# Patient Record
Sex: MALE | Race: WHITE | NOT HISPANIC OR LATINO | URBAN - METROPOLITAN AREA
[De-identification: names, ages, dates, MRNs, and addresses within clinical notes are randomized per-mention and may not be internally consistent; named-entity substitution may affect disease eponyms.]

---

## 2017-05-03 ENCOUNTER — OFFICE VISIT - HEALTHEAST (OUTPATIENT)
Dept: INTERNAL MEDICINE | Facility: CLINIC | Age: 22
End: 2017-05-03

## 2017-05-03 DIAGNOSIS — K51.90 ULCERATIVE COLITIS (H): ICD-10-CM

## 2017-05-03 DIAGNOSIS — Z00.00 HEALTH MAINTENANCE EXAMINATION: ICD-10-CM

## 2017-05-03 ASSESSMENT — MIFFLIN-ST. JEOR: SCORE: 1575.47

## 2017-05-04 LAB — HEPATITIS B SURFACE ANTIBODY LHE- HISTORICAL: POSITIVE

## 2017-05-05 ENCOUNTER — COMMUNICATION - HEALTHEAST (OUTPATIENT)
Dept: INTERNAL MEDICINE | Facility: CLINIC | Age: 22
End: 2017-05-05

## 2017-05-05 ENCOUNTER — AMBULATORY - HEALTHEAST (OUTPATIENT)
Dept: NURSING | Facility: CLINIC | Age: 22
End: 2017-05-05

## 2017-06-13 ENCOUNTER — OFFICE VISIT - HEALTHEAST (OUTPATIENT)
Dept: INTERNAL MEDICINE | Facility: CLINIC | Age: 22
End: 2017-06-13

## 2017-06-13 DIAGNOSIS — K62.5 RECTAL BLEEDING: ICD-10-CM

## 2017-06-13 ASSESSMENT — MIFFLIN-ST. JEOR: SCORE: 1570.93

## 2017-06-15 ENCOUNTER — COMMUNICATION - HEALTHEAST (OUTPATIENT)
Dept: INTERNAL MEDICINE | Facility: CLINIC | Age: 22
End: 2017-06-15

## 2017-06-15 ENCOUNTER — AMBULATORY - HEALTHEAST (OUTPATIENT)
Dept: NURSING | Facility: CLINIC | Age: 22
End: 2017-06-15

## 2017-06-15 DIAGNOSIS — Z23 POLIO VACCINE NEEDED: ICD-10-CM

## 2017-06-19 ENCOUNTER — COMMUNICATION - HEALTHEAST (OUTPATIENT)
Dept: INTERNAL MEDICINE | Facility: CLINIC | Age: 22
End: 2017-06-19

## 2017-06-19 DIAGNOSIS — K51.90 ULCERATIVE COLITIS (H): ICD-10-CM

## 2017-06-20 ENCOUNTER — RECORDS - HEALTHEAST (OUTPATIENT)
Dept: ADMINISTRATIVE | Facility: OTHER | Age: 22
End: 2017-06-20

## 2017-12-18 ENCOUNTER — RECORDS - HEALTHEAST (OUTPATIENT)
Dept: ADMINISTRATIVE | Facility: OTHER | Age: 22
End: 2017-12-18

## 2017-12-19 ENCOUNTER — RECORDS - HEALTHEAST (OUTPATIENT)
Dept: ADMINISTRATIVE | Facility: OTHER | Age: 22
End: 2017-12-19

## 2017-12-20 ENCOUNTER — COMMUNICATION - HEALTHEAST (OUTPATIENT)
Dept: INTERNAL MEDICINE | Facility: CLINIC | Age: 22
End: 2017-12-20

## 2017-12-20 ENCOUNTER — AMBULATORY - HEALTHEAST (OUTPATIENT)
Dept: NURSING | Facility: CLINIC | Age: 22
End: 2017-12-20

## 2017-12-20 DIAGNOSIS — Z00.00 ROUTINE HEALTH MAINTENANCE: ICD-10-CM

## 2017-12-20 DIAGNOSIS — Z23 FLU VACCINE NEED: ICD-10-CM

## 2018-06-12 ENCOUNTER — COMMUNICATION - HEALTHEAST (OUTPATIENT)
Dept: INTERNAL MEDICINE | Facility: CLINIC | Age: 23
End: 2018-06-12

## 2018-06-12 DIAGNOSIS — Z02.1 ENCOUNTER FOR PRE-EMPLOYMENT EXAMINATION: ICD-10-CM

## 2018-06-13 ENCOUNTER — AMBULATORY - HEALTHEAST (OUTPATIENT)
Dept: NURSING | Facility: CLINIC | Age: 23
End: 2018-06-13

## 2018-06-13 ENCOUNTER — COMMUNICATION - HEALTHEAST (OUTPATIENT)
Dept: INTERNAL MEDICINE | Facility: CLINIC | Age: 23
End: 2018-06-13

## 2018-06-13 DIAGNOSIS — Z02.1 ENCOUNTER FOR PRE-EMPLOYMENT EXAMINATION: ICD-10-CM

## 2018-06-15 ENCOUNTER — AMBULATORY - HEALTHEAST (OUTPATIENT)
Dept: NURSING | Facility: CLINIC | Age: 23
End: 2018-06-15

## 2018-06-15 ENCOUNTER — RECORDS - HEALTHEAST (OUTPATIENT)
Dept: ADMINISTRATIVE | Facility: OTHER | Age: 23
End: 2018-06-15

## 2018-06-15 DIAGNOSIS — Z02.1 ENCOUNTER FOR PRE-EMPLOYMENT EXAMINATION: ICD-10-CM

## 2018-06-15 LAB
INDURATION - HISTORICAL: 0 MM
TB SKIN TEST - HISTORICAL: NEGATIVE
TB SKIN TEST - HISTORICAL: NEGATIVE

## 2018-07-18 ENCOUNTER — COMMUNICATION - HEALTHEAST (OUTPATIENT)
Dept: INTERNAL MEDICINE | Facility: CLINIC | Age: 23
End: 2018-07-18

## 2018-12-11 ENCOUNTER — RECORDS - HEALTHEAST (OUTPATIENT)
Dept: ADMINISTRATIVE | Facility: OTHER | Age: 23
End: 2018-12-11

## 2018-12-12 ENCOUNTER — RECORDS - HEALTHEAST (OUTPATIENT)
Dept: ADMINISTRATIVE | Facility: OTHER | Age: 23
End: 2018-12-12

## 2018-12-12 ENCOUNTER — COMMUNICATION - HEALTHEAST (OUTPATIENT)
Dept: INTERNAL MEDICINE | Facility: CLINIC | Age: 23
End: 2018-12-12

## 2018-12-14 ENCOUNTER — OFFICE VISIT - HEALTHEAST (OUTPATIENT)
Dept: INTERNAL MEDICINE | Facility: CLINIC | Age: 23
End: 2018-12-14

## 2018-12-14 ENCOUNTER — COMMUNICATION - HEALTHEAST (OUTPATIENT)
Dept: INTERNAL MEDICINE | Facility: CLINIC | Age: 23
End: 2018-12-14

## 2018-12-14 DIAGNOSIS — R06.2 WHEEZING: ICD-10-CM

## 2018-12-14 DIAGNOSIS — R00.2 PALPITATIONS: ICD-10-CM

## 2018-12-14 DIAGNOSIS — I45.10 INCOMPLETE RBBB: ICD-10-CM

## 2018-12-14 DIAGNOSIS — J45.909 REACTIVE AIRWAY DISEASE WITHOUT COMPLICATION, UNSPECIFIED ASTHMA SEVERITY, UNSPECIFIED WHETHER PERSISTENT: ICD-10-CM

## 2018-12-14 LAB
ATRIAL RATE - MUSE: 54 BPM
DIASTOLIC BLOOD PRESSURE - MUSE: NORMAL MMHG
INTERPRETATION ECG - MUSE: NORMAL
P AXIS - MUSE: 55 DEGREES
PR INTERVAL - MUSE: 162 MS
QRS DURATION - MUSE: 102 MS
QT - MUSE: 408 MS
QTC - MUSE: 386 MS
R AXIS - MUSE: 87 DEGREES
SYSTOLIC BLOOD PRESSURE - MUSE: NORMAL MMHG
T AXIS - MUSE: 62 DEGREES
VENTRICULAR RATE- MUSE: 54 BPM

## 2018-12-14 RX ORDER — ALBUTEROL SULFATE 90 UG/1
2 AEROSOL, METERED RESPIRATORY (INHALATION) EVERY 6 HOURS PRN
Qty: 1 EACH | Refills: 0 | Status: SHIPPED | OUTPATIENT
Start: 2018-12-14

## 2018-12-14 RX ORDER — METOPROLOL TARTRATE 25 MG/1
25 TABLET, FILM COATED ORAL 2 TIMES DAILY PRN
Qty: 30 TABLET | Refills: 2 | Status: SHIPPED | OUTPATIENT
Start: 2018-12-14

## 2018-12-15 ENCOUNTER — COMMUNICATION - HEALTHEAST (OUTPATIENT)
Dept: INTERNAL MEDICINE | Facility: CLINIC | Age: 23
End: 2018-12-15

## 2018-12-18 ENCOUNTER — RECORDS - HEALTHEAST (OUTPATIENT)
Dept: ADMINISTRATIVE | Facility: OTHER | Age: 23
End: 2018-12-18

## 2018-12-19 ENCOUNTER — HOSPITAL ENCOUNTER (OUTPATIENT)
Dept: CARDIOLOGY | Facility: CLINIC | Age: 23
Discharge: HOME | End: 2018-12-19
Attending: INTERNAL MEDICINE

## 2018-12-19 DIAGNOSIS — R00.2 PALPITATIONS: ICD-10-CM

## 2018-12-19 LAB
AORTIC ROOT: 3.5 CM
AORTIC VALVE MEAN VELOCITY: 92.7 CM/S
ASCENDING AORTA: 2.6 CM
AV CUSP SEPERATION: 2.3 CM
AV CUSP SEPERATION: 2.3 CM
AV DIMENSIONLESS INDEX VTI: 0.8
AV MEAN GRADIENT: 4 MMHG
AV PEAK GRADIENT: 6.3 MMHG
AV VALVE AREA: 2.9 CM2
AV VELOCITY RATIO: 0.8
BSA FOR ECHO PROCEDURE: 1.72 M2
CV ECHO HEIGHT: 66 IN
CV ECHO WEIGHT: 140 LBS
DOP CALC AO PEAK VEL: 125 CM/S
DOP CALC AO VTI: 27.3 CM
DOP CALC LVOT AREA: 3.46 CM2
DOP CALC LVOT DIAMETER: 2.1 CM
DOP CALC LVOT PEAK VEL: 106 CM/S
DOP CALC LVOT STROKE VOLUME: 80 CM3
DOP CALC MV VTI: 27.3 CM
DOP CALCLVOT PEAK VEL VTI: 23.1 CM
EJECTION FRACTION: 73 % (ref 55–75)
FRACTIONAL SHORTENING: 39.6 % (ref 28–44)
INTERVENTRICULAR SEPTUM IN END DIASTOLE: 0.8 CM (ref 0.6–1)
IVS/PW RATIO: 1
LA AREA 1: 14.1 CM2
LA AREA 2: 19.4 CM2
LEFT ATRIUM LENGTH: 4.51 CM
LEFT ATRIUM SIZE: 3 CM
LEFT ATRIUM VOLUME INDEX: 30 ML/M2
LEFT ATRIUM VOLUME: 51.6 ML
LEFT VENTRICLE CARDIAC INDEX: 2.4 L/MIN/M2
LEFT VENTRICLE CARDIAC OUTPUT: 4.1 L/MIN
LEFT VENTRICLE DIASTOLIC VOLUME INDEX: 55.8 CM3/M2 (ref 34–74)
LEFT VENTRICLE DIASTOLIC VOLUME: 96 CM3 (ref 62–150)
LEFT VENTRICLE HEART RATE: 51 BPM
LEFT VENTRICLE MASS INDEX: 73.7 G/M2
LEFT VENTRICLE SYSTOLIC VOLUME INDEX: 15.1 CM3/M2 (ref 11–31)
LEFT VENTRICLE SYSTOLIC VOLUME: 26 CM3 (ref 21–61)
LEFT VENTRICULAR INTERNAL DIMENSION IN DIASTOLE: 4.8 CM (ref 4.2–5.8)
LEFT VENTRICULAR INTERNAL DIMENSION IN SYSTOLE: 2.9 CM (ref 2.5–4)
LEFT VENTRICULAR MASS: 126.7 G
LEFT VENTRICULAR OUTFLOW TRACT MEAN GRADIENT: 3 MMHG
LEFT VENTRICULAR OUTFLOW TRACT MEAN VELOCITY: 78.5 CM/S
LEFT VENTRICULAR OUTFLOW TRACT PEAK GRADIENT: 4 MMHG
LEFT VENTRICULAR POSTERIOR WALL IN END DIASTOLE: 0.8 CM (ref 0.6–1)
LV STROKE VOLUME INDEX: 46.5 ML/M2
MITRAL VALVE DECELERATION SLOPE: 3430 MM/S2
MITRAL VALVE E/A RATIO: 2.1
MITRAL VALVE MEAN INFLOW VELOCITY: 36.2 CM/S
MITRAL VALVE PEAK VELOCITY: 95.2 CM/S
MITRAL VALVE PRESSURE HALF-TIME: 85 MS
MV AREA VTI: 2.93 CM2
MV AVERAGE E/E' RATIO: 4.6 CM/S
MV DECELERATION TIME: 120 MS
MV E'TISSUE VEL-LAT: 24.1 CM/S
MV E'TISSUE VEL-MED: 15.5 CM/S
MV LATERAL E/E' RATIO: 3.8
MV MEAN GRADIENT: 1 MMHG
MV MEDIAL E/E' RATIO: 5.9
MV PEAK A VELOCITY: 43.9 CM/S
MV PEAK E VELOCITY: 91.3 CM/S
MV PEAK GRADIENT: 3.6 MMHG
MV VALVE AREA BY CONTINUITY EQUATION: 2.9 CM2
MV VALVE AREA PRESSURE 1/2 METHOD: 2.6 CM2
NUC REST DIASTOLIC VOLUME INDEX: 2240 LBS
NUC REST SYSTOLIC VOLUME INDEX: 66 IN
PR MAX PG: 4 MMHG
PR PEAK VELOCITY: 110 CM/S
TRICUSPID REGURGITATION PEAK PRESSURE GRADIENT: 25.8 MMHG
TRICUSPID VALVE ANULAR PLANE SYSTOLIC EXCURSION: 2.6 CM
TRICUSPID VALVE PEAK REGURGITANT VELOCITY: 254 CM/S

## 2018-12-19 ASSESSMENT — MIFFLIN-ST. JEOR: SCORE: 1552.79

## 2018-12-21 ENCOUNTER — OFFICE VISIT - HEALTHEAST (OUTPATIENT)
Dept: CARDIOLOGY | Facility: CLINIC | Age: 23
End: 2018-12-21

## 2018-12-21 DIAGNOSIS — R00.2 PALPITATIONS: ICD-10-CM

## 2018-12-21 ASSESSMENT — MIFFLIN-ST. JEOR: SCORE: 1574.33

## 2018-12-23 ENCOUNTER — COMMUNICATION - HEALTHEAST (OUTPATIENT)
Dept: INTERNAL MEDICINE | Facility: CLINIC | Age: 23
End: 2018-12-23

## 2019-01-13 ENCOUNTER — COMMUNICATION - HEALTHEAST (OUTPATIENT)
Dept: INTERNAL MEDICINE | Facility: CLINIC | Age: 24
End: 2019-01-13

## 2019-01-29 ENCOUNTER — COMMUNICATION - HEALTHEAST (OUTPATIENT)
Dept: INTERNAL MEDICINE | Facility: CLINIC | Age: 24
End: 2019-01-29

## 2019-02-04 ENCOUNTER — COMMUNICATION - HEALTHEAST (OUTPATIENT)
Dept: INTERNAL MEDICINE | Facility: CLINIC | Age: 24
End: 2019-02-04

## 2019-03-25 ENCOUNTER — COMMUNICATION - HEALTHEAST (OUTPATIENT)
Dept: INTERNAL MEDICINE | Facility: CLINIC | Age: 24
End: 2019-03-25

## 2019-05-06 ENCOUNTER — COMMUNICATION - HEALTHEAST (OUTPATIENT)
Dept: INTERNAL MEDICINE | Facility: CLINIC | Age: 24
End: 2019-05-06

## 2019-05-06 ENCOUNTER — COMMUNICATION - HEALTHEAST (OUTPATIENT)
Dept: SCHEDULING | Facility: CLINIC | Age: 24
End: 2019-05-06

## 2019-05-08 ENCOUNTER — OFFICE VISIT - HEALTHEAST (OUTPATIENT)
Dept: INTERNAL MEDICINE | Facility: CLINIC | Age: 24
End: 2019-05-08

## 2019-05-08 DIAGNOSIS — R10.13 DYSPEPSIA: ICD-10-CM

## 2019-05-08 DIAGNOSIS — Z11.1 SCREENING-PULMONARY TB: ICD-10-CM

## 2019-05-08 LAB
ALBUMIN SERPL-MCNC: 4.3 G/DL (ref 3.5–5)
ALP SERPL-CCNC: 100 U/L (ref 45–120)
ALT SERPL W P-5'-P-CCNC: 20 U/L (ref 0–45)
ANION GAP SERPL CALCULATED.3IONS-SCNC: 10 MMOL/L (ref 5–18)
AST SERPL W P-5'-P-CCNC: 15 U/L (ref 0–40)
BILIRUB SERPL-MCNC: 0.9 MG/DL (ref 0–1)
BUN SERPL-MCNC: 11 MG/DL (ref 8–22)
CALCIUM SERPL-MCNC: 10 MG/DL (ref 8.5–10.5)
CHLORIDE BLD-SCNC: 103 MMOL/L (ref 98–107)
CO2 SERPL-SCNC: 29 MMOL/L (ref 22–31)
CREAT SERPL-MCNC: 0.86 MG/DL (ref 0.7–1.3)
GFR SERPL CREATININE-BSD FRML MDRD: >60 ML/MIN/1.73M2
GLUCOSE BLD-MCNC: 87 MG/DL (ref 70–125)
HGB BLD-MCNC: 16.4 G/DL (ref 14–18)
POTASSIUM BLD-SCNC: 4.4 MMOL/L (ref 3.5–5)
PROT SERPL-MCNC: 6.9 G/DL (ref 6–8)
SODIUM SERPL-SCNC: 142 MMOL/L (ref 136–145)

## 2019-05-08 RX ORDER — MESALAMINE 1.2 G/1
1200 TABLET, DELAYED RELEASE ORAL
Status: SHIPPED | COMMUNITY
Start: 2019-05-08

## 2019-05-08 ASSESSMENT — MIFFLIN-ST. JEOR: SCORE: 1544.56

## 2019-05-09 ENCOUNTER — COMMUNICATION - HEALTHEAST (OUTPATIENT)
Dept: INTERNAL MEDICINE | Facility: CLINIC | Age: 24
End: 2019-05-09

## 2019-05-10 ENCOUNTER — AMBULATORY - HEALTHEAST (OUTPATIENT)
Dept: LAB | Facility: CLINIC | Age: 24
End: 2019-05-10

## 2019-05-10 ENCOUNTER — AMBULATORY - HEALTHEAST (OUTPATIENT)
Dept: NURSING | Facility: CLINIC | Age: 24
End: 2019-05-10

## 2019-05-10 DIAGNOSIS — R10.13 DYSPEPSIA: ICD-10-CM

## 2019-05-10 DIAGNOSIS — Z00.00 PREVENTATIVE HEALTH CARE: ICD-10-CM

## 2019-05-10 LAB — TB SKIN TEST - HISTORICAL: NEGATIVE

## 2019-05-13 LAB
H PYLORI AG STL QL IA: NORMAL
REPORT STATUS: NORMAL
SPECIMEN DESCRIPTION: NORMAL

## 2019-12-13 ENCOUNTER — COMMUNICATION - HEALTHEAST (OUTPATIENT)
Dept: INTERNAL MEDICINE | Facility: CLINIC | Age: 24
End: 2019-12-13

## 2020-06-16 ENCOUNTER — RECORDS - HEALTHEAST (OUTPATIENT)
Dept: ADMINISTRATIVE | Facility: OTHER | Age: 25
End: 2020-06-16

## 2021-05-12 ENCOUNTER — RECORDS - HEALTHEAST (OUTPATIENT)
Dept: ADMINISTRATIVE | Facility: OTHER | Age: 26
End: 2021-05-12

## 2021-05-18 ENCOUNTER — RECORDS - HEALTHEAST (OUTPATIENT)
Dept: ADMINISTRATIVE | Facility: OTHER | Age: 26
End: 2021-05-18

## 2021-05-26 ENCOUNTER — RECORDS - HEALTHEAST (OUTPATIENT)
Dept: ADMINISTRATIVE | Facility: OTHER | Age: 26
End: 2021-05-26

## 2021-05-28 NOTE — PATIENT INSTRUCTIONS - HE
Avoid alcohol, tobacco, carbonated drinks, coffee, tea, chocolate, caffeine.  Avoid fatty foods.  Avoid citrus and tomato based products.  Don't eat within 3h laying down.  May raise up head of bed 6 inches by placing books or a brick under frame.  If overweight, weight loss can help.  Avoid ibuprofen or aleve.  If heartburn becomes exertional, changes in character, develop jet-black tarry or bloody stools, have coffee ground or bloody vomit or not controlled with medications and lifestyle adjustments, seek medical attention promptly.  If you develop increasing difficulty swallowing or pain with swallowing seek medical attention promptly.    Whole Foods, Plant-Based    Abundant vegetables.    Only whole grains.    2-4 fruits/day.    Avoid animal products such as dairy, eggs and meat. (instead, take a vitamin b12 supplement)    Avoid sugars, oils and other processed foods.    Documentary: [place]     Web: Nutritionstudies.org, Nutritionfacts.org    Books: How Not to Die (Dago), The Frederick Study (Jose Armando)    Omeprazole 20mg daily x 2 weeks after stool sample.

## 2021-05-28 NOTE — TELEPHONE ENCOUNTER
Spoke with pt and let him know he could get the mantoux on Wednesday.  Verified with pt that he could come in on Friday to get it read.  Pt understanding.

## 2021-05-28 NOTE — TELEPHONE ENCOUNTER
"Heartburn continues and abdominal pain x \"few months\".    H2 blockers have temporarily worked for epigastric pain but not recently.  Deep breath in will cause pain.    Hx ulcerative colitis.    Pain has returned and patient would like to follow up with any available provider.    Krystal Barroso RN, [facility] Nurse Triage/Med Refills RN     Reason for Disposition    Intermittent burning pains radiating into chest or sour taste in mouth    Protocols used: ABDOMINAL PAIN - UPPER-A-OH      "

## 2021-05-28 NOTE — TELEPHONE ENCOUNTER
New Appointment Needed  What is the reason for the visit:    Mantoux Placement  Appt Request  What is the purpose of the mantoux?:  School:    Is there a form to be completed?:   Yes  How soon do you need the mantoux placed?:  asap    Provider Preference: Any available  How soon do you need to be seen?: 5/8  Waitlist offered?: No  Okay to leave a detailed message:  Yes    Patient has an appointment scheduled on 5/8 at the clinic. Would like to get this done at that time.     Please call and advise.

## 2021-05-28 NOTE — PROGRESS NOTES
Health system Clinic Note    Patient Name: Joon Zamora  Patient Age: 23 y.o.  YOB: 1995  MRN: [id number]    Date of visit: 5/8/2019    Assessment/Plan:  No results found for this or any previous visit (from the past 24 hour(s)).  Medications Ordered   Medications     tuberculin injection 5 Units       ICD-10-CM    1. Dyspepsia R10.13 Comprehensive Metabolic Panel     Hemoglobin     H. pylori Antigen, Stool(HPSAG)     Occult Blood, Fecal   2. Screening-pulmonary TB Z11.1 tuberculin injection 5 Units       He would rather not do chest x-ray or ultrasound at this point.  He would rather just do some lab and try omeprazole again and see if this helps.  We discussed that if it is getting worse at all we could do those tests at a later time.  This does not sound cardiac to me.  Most likely gastroesophageal reflux.    Counseled patient regarding treatments, treatment options, risks and benefits and diagnosis.  The patient was interactive, attentive, verbalized understanding, and we discussed plan.       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  Sees a complementary/alternative practitioner as well     Family History   Problem Relation Age of Onset     Depression Mother      Anxiety disorder Mother      Bipolar disorder Father      Alcohol abuse Maternal Grandmother      Hypertension Maternal Grandfather      Depression Paternal Grandmother      Heart disease Paternal Grandfather         CABG     Outpatient Encounter Medications as of 5/8/2019   Medication Sig Dispense Refill     albuterol (PROAIR HFA;PROVENTIL HFA;VENTOLIN HFA) 90 mcg/actuation inhaler Inhale 2 puffs every 6 (six) hours as needed for wheezing. 1 each 0     herbal drugs Tab Take by mouth.       mesalamine (LIALDA) 1.2 gram EC tablet Take 1,200 mg by mouth daily with  "breakfast.       metoprolol tartrate (LOPRESSOR) 25 MG tablet Take 1 tablet (25 mg total) by mouth 2 (two) times a day as needed (palpitations). 30 tablet 2     Facility-Administered Encounter Medications as of 5/8/2019   Medication Dose Route Frequency Provider Last Rate Last Dose     tuberculin injection 5 Units  5 Units Intradermal Once Oleg Santos MD           Chief Complaint:   Chief Complaint   Patient presents with     Heartburn     Abdominal Pain     right side, spasms, when he takes deep breathes     Mantoux     needed for school       /78 (Patient Site: Left Arm, Patient Position: Sitting, Cuff Size: Adult Regular)   Pulse (!) 50   Ht 5' 6\" (1.676 m)   Wt 138 lb 3 oz (62.7 kg)   SpO2 99%   BMI 22.30 kg/m    HPI:   D.O. Student.  Has been having more stress because of this.  Has been feeling heartburn all semester.  Woke up a few nights in a row with epigastric pain which responded to omeprazole.  Took ranitidine for 2 months, had slight amount of heartburn with this.  It resolved for awhile and then 2 weeks ago started again.  Started zantac again.  Using at night primarily - notices when he lies down for bed.  Doesn't happen during the day when upright and able to do elliptical without any symptoms.       When he takes deep breaths will have slight pain ruq and right upper chest wall.  No abdominal pain otherwise.      No hematochezia.       ROS: Pertinent ros findings in hpi, all other systems negative.    Objective/Physical Exam:     /78 (Patient Site: Left Arm, Patient Position: Sitting, Cuff Size: Adult Regular)   Pulse (!) 50   Ht 5' 6\" (1.676 m)   Wt 138 lb 3 oz (62.7 kg)   SpO2 99%   BMI 22.30 kg/m      Gen: NAD, appears age  Skin: warm, dry  HENT: normocephalic atraumatic, MMM  Eyes: non-icteric, no proptosis  CV: NRRR no m/r/g, no peripheral edema  Resp: CTAB no w/r/r, normal respiratory effort  Abd: non-distended, soft  Hematologic: No petechiae or purpura  MSK: no " muscle or joint swelling  Neuro: no dysarthria or gross asymmetry  Psych: Cooperative, full affect    tender to palpation:no  soft:Yes  distended:no  pain with percussion:  no  hernia palpated: no  hepato-splenomegaly:no  masses: no    Jones's positive: no    ecchymoses:no  suprapubic tenderness:no  >3cm pulsating mass: no    Exam limited by body habitus: no     chest wall nttp.          Oleg Santos MD

## 2021-05-30 VITALS — WEIGHT: 145 LBS | BODY MASS INDEX: 23.3 KG/M2 | HEIGHT: 66 IN

## 2021-05-31 VITALS — HEIGHT: 66 IN | WEIGHT: 144 LBS | BODY MASS INDEX: 23.14 KG/M2

## 2021-06-01 ENCOUNTER — OFFICE VISIT (OUTPATIENT)
Dept: OCCUPATIONAL MEDICINE | Age: 26
End: 2021-06-01

## 2021-06-01 VITALS
HEART RATE: 66 BPM | SYSTOLIC BLOOD PRESSURE: 98 MMHG | HEIGHT: 67 IN | WEIGHT: 144.4 LBS | RESPIRATION RATE: 16 BRPM | BODY MASS INDEX: 22.66 KG/M2 | DIASTOLIC BLOOD PRESSURE: 60 MMHG

## 2021-06-01 DIAGNOSIS — Z02.89 VISIT FOR OCCUPATIONAL HEALTH EXAMINATION: ICD-10-CM

## 2021-06-01 DIAGNOSIS — Z02.89 ENCOUNTER FOR OCCUPATIONAL HEALTH EXAMINATION: Primary | ICD-10-CM

## 2021-06-01 PROCEDURE — OH021 PRE PLACEMENT PHYSICAL EXAM: Performed by: PREVENTIVE MEDICINE

## 2021-06-01 PROCEDURE — OH062 RESPIRATOR FIT TESTING: Performed by: PREVENTIVE MEDICINE

## 2021-06-01 PROCEDURE — OH071 RESPIRATORY (PFT) QUESTIONNAIRE: Performed by: PREVENTIVE MEDICINE

## 2021-06-01 PROCEDURE — OH044 DRUG SCREEN HAIR COLLECTION ONLY: Performed by: PREVENTIVE MEDICINE

## 2021-06-01 PROCEDURE — OH138 COMPREHENSIVE EYE EXAM: Performed by: PREVENTIVE MEDICINE

## 2021-06-01 PROCEDURE — OH053 WHISPER TEST PERFORMED IN OCC HEALTH: Performed by: PREVENTIVE MEDICINE

## 2021-06-01 PROCEDURE — 36415 COLL VENOUS BLD VENIPUNCTURE: CPT | Performed by: PREVENTIVE MEDICINE

## 2021-06-02 VITALS — BODY MASS INDEX: 22.44 KG/M2 | HEIGHT: 67 IN | WEIGHT: 143 LBS

## 2021-06-02 VITALS — WEIGHT: 138.19 LBS | BODY MASS INDEX: 22.21 KG/M2 | HEIGHT: 66 IN

## 2021-06-02 VITALS — BODY MASS INDEX: 22.5 KG/M2 | WEIGHT: 140 LBS | HEIGHT: 66 IN

## 2021-06-02 VITALS — BODY MASS INDEX: 22.71 KG/M2 | WEIGHT: 140.7 LBS

## 2021-06-10 NOTE — PROGRESS NOTES
ASSESSMENT:  1. Health maintenance examination  Joon Zamora is a healthy 21-year-old man aside from recent diagnosis of ulcerative colitis, who presents for follow-up, physical before starting [employer] medical school at [place] [facility] [place] [facility] [employer]  In regards to the ulcerative colitis, he had one episode after eating out and likely had infectious diarrhea which caused a flareup of the ulcerative colitis.  He has not had issues since.  We have requested records to be sent for this.  He needs to complete the series for polio vaccination, get PPD check, and titers as outlined below before starting school.  He is going to be attending an anatomy program before school.  - Hepatitis B Surface Antibody (Anti-HBs)  - Varicella Zoster Immune Status Antibody, IgG  - Rubeola Immune Status, IgG  - Mumps Immune Status Antibody, IgG  - Rubella Immune Status (IgG)  - Read PPD; Future  - TB Skin Test  - Poliovirus vaccine IPV subcutaneous/IM    2. Ulcerative colitis  No recurrence of symptoms.  Requested records for this workup.  We will need to discuss how this changes his colon cancer screening in the future.        PLAN:  Patient Instructions   Vaccine for polio    PPD skin test to screen for TB    Lab titers       Orders Placed This Encounter   Procedures     Poliovirus vaccine IPV subcutaneous/IM     Hepatitis B Surface Antibody (Anti-HBs)     Varicella Zoster Immune Status Antibody, IgG     Rubeola Immune Status, IgG     Mumps Immune Status Antibody, IgG     Rubella Immune Status (IgG)     Read PPD     Standing Status:   Future     Standing Expiration Date:   5/6/2017     TB Skin Test     Medications Discontinued During This Encounter   Medication Reason     thyroid, pork, 15 mg Tab Therapy completed       Return for Annual physical.    CHIEF COMPLAINT:  Chief Complaint   Patient presents with     Annual Exam       HISTORY OF PRESENT ILLNESS:  Joon is a 21 y.o. male presenting to the clinic today for his annual exam. He was  recently accepted to medical school and they require that he gets examined and updated on his vaccinations before coming to school.     REVIEW OF SYSTEMS:   Last summer he underwent a sigmoidoscopy which revealed UC. He is currently controlling his US through diet. He denies any issues or complications. He endorses experiencing bloody diarrhea before his sigmoidoscopy. He denies hearing or vision problems. He denies testicular nodules and groin lumps. He is active and enjoys running or lifting on occasion. All other systems are negative.    PFSH:  He was recently accepted to medical school and would like to pursue a career in family medicine. He had chicken pox as a child. 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 Reviewed as below.  History   Smoking Status     Never Smoker   Smokeless Tobacco     Not on file       Family History   Problem Relation Age of Onset     Depression Mother      Anxiety disorder Mother      Bipolar disorder Father      Alcohol abuse Maternal Grandmother      Hypertension Maternal Grandfather      Depression Paternal Grandmother      Heart disease Paternal Grandfather      CABG       Social History     Social History     Marital status: Single     Spouse name: N/A     Number of children: N/A     Years of education: N/A     Occupational History     Not on file.     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  Sees a complementary/alternative practitioner as well       Past Surgical History:   Procedure Laterality Date     NASAL SEPTUM SURGERY  2009, 10       Allergies   Allergen Reactions     Pollen Other (See Comments)       Active Ambulatory Problems     Diagnosis Date Noted     Ulcerative colitis 06/01/2016  "    Resolved Ambulatory Problems     Diagnosis Date Noted     No Resolved Ambulatory Problems     No Additional Past Medical History       VITALS:  Vitals:    05/03/17 1033   BP: 102/60   Pulse: (!) 55   Resp: 16   Temp: 97.6  F (36.4  C)   TempSrc: Tympanic   Weight: 145 lb (65.8 kg)   Height: 5' 6\" (1.676 m)     Wt Readings from Last 3 Encounters:   05/03/17 145 lb (65.8 kg)   07/31/15 148 lb (67.1 kg)     Body mass index is 23.4 kg/(m^2).    PHYSICAL EXAM:  ENT: Good dentition.  Neck: No lymphadenopathy  Lungs: Clear to auscultation  Heart: Regular rate and rhythm.  Abdomen: Soft. No masses or tenderness  General: Alert and well appearing.     ADDITIONAL HISTORY SUMMARIZED (2): Reviewed physical from 7/31/15 regarding patient history.  DECISION TO OBTAIN EXTRA INFORMATION (1): Requested outside records regarding sigmoidoscopy.   RADIOLOGY TESTS (1): None.  LABS (1): Ordered labs.  MEDICINE TESTS (1): None.  INDEPENDENT REVIEW (2 each): None.     The visit lasted a total of 20 minutes face to face with the patient. Over 50% of the time was spent counseling and educating the patient about health maintenance.    Harish RIVERA, am scribing for and in the presence of, Dr. Pavon.    IDr. Pavon, personally performed the services described in this documentation, as scribed by Harish Isidro in my presence, and it is both accurate and complete.    MEDICATIONS:  Current Outpatient Prescriptions   Medication Sig Dispense Refill     herbal drugs Tab Take by mouth.       No current facility-administered medications for this visit.        Total data points:4      "

## 2021-06-11 NOTE — PROGRESS NOTES
[facility] Clinic Note    Joon Zamora   21 y.o. male    Date of Visit: 2017  Chief Complaint   Patient presents with     Rectal Bleeding     Intermittent, bright red       ASSESSMENT/PLAN  1. Rectal bleeding       ---------------------------------------------    Small amount of rectal bleeding manifested by blood on the side of the stool, most likely internal hemorrhoid.  It is still possible that he could have ulcerative colitis, but I would expect more bleeding and diarrhea.  I recommended hydrocortisone 25 mg suppository twice a day as needed, can taper off this once symptoms improve.  If he still is rectal bleeding, we can start him on mesalamine again, possibly prednisone taper if more severe in the process of getting him back in with gastroenterology.    He will return for polio vaccination later, since our last vial has .  No vaccines given this visit.    Return if symptoms worsen or fail to improve, for Next scheduled follow up.      SUBJECTIVE  Joon Zamora is a 21-year-old man who presents for rectal bleeding ×2 weeks.  He has his usual 2-3 bowel movements per day, occasionally solid occasionally looser, with a streak of blood on the side of the stool.  This is not associated with any pain or bloating.  He does not have any blood mixed in with the stools.  This is reminiscent of last year, when he was diagnosed with ulcerative colitis with flexible sigmoidoscopy and biopsy showing crypt abscesses.  He was prescribed Lialda (mesalamine) and prednisone with an extended taper.  Symptoms resolved.    Flexible sigmoidoscopy also showed that he had internal hemorrhoids.  We discussed this.    He needs polio vaccination, but we no longer have this, need this ordered.    He moves down to [place] on  for [employer] medical school, anatomy Boot Camp starts July 3, class starts at the end of July.    ROS A comprehensive review of systems was performed and was otherwise  "negative    Medications, allergies, and problem list were reviewed and updated    Patient Active Problem List   Diagnosis     Ulcerative colitis     No past medical history on file.  Past Surgical History:   Procedure Laterality Date     NASAL SEPTUM SURGERY  2009, 10     Social History     Social History     Marital status: Single     Spouse name: N/A     Number of children: N/A     Years of education: N/A     Occupational History     Not on file.     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  Sees a complementary/alternative practitioner as well     Family History   Problem Relation Age of Onset     Depression Mother      Anxiety disorder Mother      Bipolar disorder Father      Alcohol abuse Maternal Grandmother      Hypertension Maternal Grandfather      Depression Paternal Grandmother      Heart disease Paternal Grandfather      CABG       Current Outpatient Prescriptions   Medication Sig Dispense Refill     herbal drugs Tab Take by mouth.       hydrocortisone 25 mg suppository Insert 1 suppository (25 mg total) into the rectum 2 (two) times a day for 7 days. 15 suppository 1     No current facility-administered medications for this visit.        Allergies   Allergen Reactions     Pollen Other (See Comments)       EXAM  Vitals:    06/13/17 1350   BP: 112/60   Patient Site: Left Arm   Patient Position: Sitting   Cuff Size: Adult Regular   Pulse: (!) 54   SpO2: 99%   Weight: 144 lb (65.3 kg)   Height: 5' 6\" (1.676 m)     General: Alert, no distress  ENT: Sclera anicteric, oral mucosa moist  Skin: No rashes  Rectal: No external hemorrhoids, no rectal masses or obvious internal hemorrhoids.  No blood on gloved finger at the end of the exam.  No sign " of perianal abscesses or fissures.    RESULTS REVIEWED:   Reviewed hospital stay from last year including flexible sigmoidoscopy and biopsy.    Data points 4    Rolando Pavon DO  Internal Medicine  [facility]

## 2021-06-13 ENCOUNTER — HEALTH MAINTENANCE LETTER (OUTPATIENT)
Age: 26
End: 2021-06-13

## 2021-06-16 PROBLEM — I45.10 INCOMPLETE RBBB: Status: ACTIVE | Noted: 2018-12-14

## 2021-06-16 PROBLEM — R06.2 WHEEZING: Status: ACTIVE | Noted: 2018-12-14

## 2021-06-16 PROBLEM — R00.2 PALPITATIONS: Status: ACTIVE | Noted: 2018-12-14

## 2021-06-22 NOTE — PROGRESS NOTES
[facility] Clinic Note    Joon Zamora   23 y.o. male    Date of Visit: 12/14/2018  Chief Complaint   Patient presents with     Follow-up     Chest Pain       ASSESSMENT/PLAN  1. Palpitations  DORCAS Hook-Up    Electrocardiogram Perform and Read    Echo With Bubble Study    Ambulatory referral to Cardiology   2. Incomplete RBBB     3. Wheezing  albuterol (PROAIR HFA;PROVENTIL HFA;VENTOLIN HFA) 90 mcg/actuation inhaler   4. Reactive airway disease without complication, unspecified asthma severity, unspecified whether persistent  albuterol (PROAIR HFA;PROVENTIL HFA;VENTOLIN HFA) 90 mcg/actuation inhaler     ---------------------------------------------    1-2. Young healthy man who experienced episodic palpitations and more severe ones in the context of high stress and use of stimulant for focus.  EKG showed RAD, incomplete RBBB and recommended echo with bubble to eval for ASD, event monitor for arrhythmia evaluation (10d), repeat EKG (actually normal now) and cardiology consultation.  I am not sure why the tracing is now normal.  I don't have a very high suspicion of cardiac disease, but SVT is a possibility or similar.  Stress a factor, and anxiety as a diagnosis of exclusion  --in the event of palpitation, recommended trying to take a metoprolol, but would be more prudent to see what is actually causing these symptoms first.    --unclear why drinking cold water helps (vagal tone?)    3. Wheezing noted with exercise.  I recommended an inhaler to try before exercise, more workup at a later date.     4.  See above    Return in about 2 weeks (around 12/28/2018) for Recheck.      SUBJECTIVE  Joon Zamora is here for follow-up.     About 3 weeks ago he saw a PA for fatigue, was having trouble sleeping and learning at med school in [place].      Thyroid, hm2, vitamin b12 all normal.  He was having flaring of UC at the beginning of the semester.    After all the labs were back they suggested a sleep study.     He had some difficulty breathing with exercise, wheezing.      During finals, things have been very stressful, facing difficult tests.  He was drinking coffee, not sleeping, fatigue getting worse.  A friend offered him Adderall for 3 days (10 mg IR) noon each day with lunch.  This was 12/6-8.  On the 8th about 2h after taking the last dose he was studying and heart started to beat really fast.  He has a history of palpitations.  His friend took him back to his house, he took activated charcoal to try and get it out of his system.  At the house the heart started beating extremely hard and fast.  He had to lie down.  He tried carotid massage.  He didn't check his heart rate.  The rate started to come down.  He got uncontrollable tremors after that then felt ok the rest of the day.  The following day he was taking a hot shower and 30 min after the heart started beating really fast.  He had to lie down and do carotid massage.  It eventually subside.  The onset is always sudden.  Occasionally would feel a little chilled or shaky.      Then 2 days after the initial episode he was drinking a lot of water and didn't have any issues, but notes he did not take a hot shower.  He was back to studying again and was able to stay focused.  Then the day after that when getting ready to take the test (now 3 days ago from today).  After taking a hot shower again he developed palpitations again.       ROS A comprehensive review of systems was performed and was otherwise negative    Medications, allergies, and problem list were reviewed and updated    Patient Active Problem List   Diagnosis     Ulcerative colitis (H)     Palpitations     Incomplete RBBB     Wheezing     No past medical history on file.  Past Surgical History:   Procedure Laterality Date     NASAL SEPTUM SURGERY  2009, 10     Social History     Socioeconomic History     Marital status: Single     Spouse name: Not on file     Number of children: Not on file     Years  of education: Not on file     Highest education level: Not on file   Social Needs     Financial resource strain: Not on file     Food insecurity - worry: Not on file     Food insecurity - inability: Not on file     Transportation needs - medical: Not on file     Transportation needs - non-medical: Not on file   Occupational History     Not on file   Tobacco Use     Smoking status: Never Smoker     Smokeless tobacco: Never Used   Substance and Sexual Activity     Alcohol use: Yes     Alcohol/week: 0.6 oz     Types: 1 Cans of beer per week     Drug use: No     Sexual activity: No   Other Topics Concern     Not on file   Social History Narrative    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  Sees a complementary/alternative practitioner as well     Family History   Problem Relation Age of Onset     Depression Mother      Anxiety disorder Mother      Bipolar disorder Father      Alcohol abuse Maternal Grandmother      Hypertension Maternal Grandfather      Depression Paternal Grandmother      Heart disease Paternal Grandfather         CABG       Current Outpatient Medications   Medication Sig Dispense Refill     albuterol (PROAIR HFA;PROVENTIL HFA;VENTOLIN HFA) 90 mcg/actuation inhaler Inhale 2 puffs every 6 (six) hours as needed for wheezing. 1 each 0     herbal drugs Tab Take by mouth.       metoprolol tartrate (LOPRESSOR) 25 MG tablet Take 1 tablet (25 mg total) by mouth 2 (two) times a day as needed (palpitations). 30 tablet 2     No current facility-administered medications for this visit.        Allergies   Allergen Reactions     Pollen Other (See Comments)       EXAM  Vitals:    12/14/18 1447   BP: 118/64   Patient Site: Left Arm   Patient Position: Sitting   Cuff Size: Adult Regular   Pulse: 66   SpO2: 98%   Weight: 140 lb 11.2 oz (63.8 kg)         General: alert, no distress  HEENT: sclerae anicteric, moist oral mucosa  Heart: Regular rate and rhythm, no murmurs.  Physiologic split S2.  No pretibial edema.   Strong PMI.  Warm extremities  Lungs: Clear to auscultation bilat  Gastrointestinal: abdomen is non-distended.    Skin: warm/dry, no rashes  Neuro: no gross abnormalities  Psych: calm, not appearing anxious      RESULTS REVIEWED:     ANALYSIS AND SUMMARY OF OLD RECORDS, NOTES AND CONSULTS (2): reviewed ER notes from 12/11    RECORDS REQUESTED (1): None.     OTHER HISTORY SUMMARIZED (from nursing staff, family, friends) (2):     RADIOLOGY TESTS SUMMARIZED or REQUESTED (XRAY/CT/MRI/DXA) (1):     MEDICINE TESTS SUMMARIZED or REQUESTED (EKG/ECHO/COLONOSCOPY/EGD) (1): EKG reviewed, sinus arrhythmia, incomplete RBBB, early repolarization    INDEPENDENT REVIEW OF EKG OR X-RAY (2): None    Troponin, hemoglobin, creatinine normal    Data points  4     Rolando Pavon DO  Internal Medicine  [facility]

## 2021-06-27 NOTE — PROGRESS NOTES
Progress Notes by Kaya Cormier MD at 12/21/2018 12:50 PM     Author: Kaya Cormier MD Service: -- Author Type: Physician    Filed: 12/21/2018  1:37 PM Encounter Date: 12/21/2018 Status: Signed    : Kaya Cormier MD (Physician)           Click to link to [facility] system [facility] HEART CARE NOTE    Thank you, Dr. Pavon, for asking us to see Joon Zamora at the [facility].      Assessment/Recommendations   Assessment:    23-year-old man who I am seeing today in rapid access clinic for palpitations.  He had 2 episodes of palpitations that do sound consistent with possible supraventricular tachycardia.  This may have been triggered by increased caffeine intake, dehydration and taking Adderall.  No recurrent symptoms since that time.  Echocardiogram shows no structural heart disease.  If the DORCAS monitor is unremarkable no further workup at this time recommended.       History of Present Illness    Mr. Joon Zamora is a 23 y.o. male who is here in rapid access clinic for palpitations.  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  He was under a great deal of stress and studying for finals.  He normally does not drink coffee or caffeine and was drinking a great deal more of caffeine for studying.  He also took Adderall that he was given from a friend.  On the third day of taking Adderall he developed palpitations.  His heart was racing and pounding very hard for 5 minutes and then resolved on its own.  He had another episode of this that occurred the other day after taking a hot shower.  No recurrent episodes since that time.  He was seen in urgent care and underwent an EKG and lab work.  I cannot find the lab work he reports these were normal.  EKG was unremarkable.  Twelve-lead EKG on 12/14/18 shows sinus bradycardia at 54 bpm otherwise normal.      Echocardiogram 12/19/18 left ventricle ejection fraction is normal. The calculated left ventricular  ejection fraction is 73%.    Normal left ventricular size and systolic function.    Normal right ventricular size and systolic function.    No hemodynamically significant valvular heart abnormalities.     Physical Examination Review of Systems   Vitals:    12/21/18 1245   BP: 120/62   Pulse: (!) 56   Resp: 16     Body mass index is 22.74 kg/m .  Wt Readings from Last 3 Encounters:   12/21/18 143 lb (64.9 kg)   12/19/18 140 lb (63.5 kg)   12/14/18 140 lb 11.2 oz (63.8 kg)       General Appearance:   alert, no apparent distress   HEENT:  no scleral icterus; the mucous membranes are pink and moist                                  Neck: jugular venous pressure normal, no thyromegaly   Chest: the spine is straight and the chest is symmetric   Lungs:   respirations unlabored; the lungs are clear to auscultation   Cardiovascular:   regular rhythm with normal first and second heart sounds and no murmurs or gallops;  there are no carotid bruits.   Abdomen:  no organomegaly, masses, bruits, or tenderness; bowel sounds are present   Extremities: no edema   Skin: no xanthelasma    General: WNL  Eyes: WNL  Ears/Nose/Throat: WNL  Lungs: Shortness of Breath  Heart: Chest Pain, Irregular Heartbeat  Stomach: Diarrhea  Bladder: WNL  Muscle/Joints: WNL  Skin: WNL  Nervous System: WNL  Mental Health: WNL     Blood: WNL     Medical History  Surgical History Family History Social History   Ulcerative colitis Past Surgical History:   Procedure Laterality Date   ? NASAL SEPTUM SURGERY  2009, 8    Family History   Problem Relation Age of Onset   ? Depression Mother    ? Anxiety disorder Mother    ? Bipolar disorder Father    ? Alcohol abuse Maternal Grandmother    ? Hypertension Maternal Grandfather    ? Depression Paternal Grandmother    ? Heart disease Paternal Grandfather         CABG    Social History     Socioeconomic History   ? Marital status: Single     Spouse name: Not on file   ? Number of children: Not on file   ? Years of  education: Not on file   ? Highest education level: Not on file   Social Needs   ? Financial resource strain: Not on file   ? Food insecurity - worry: Not on file   ? Food insecurity - inability: Not on file   ? Transportation needs - medical: Not on file   ? Transportation needs - non-medical: Not on file   Occupational History   ? Not on file   Tobacco Use   ? Smoking status: Never Smoker   ? Smokeless tobacco: Never Used   Substance and Sexual Activity   ? Alcohol use: Yes     Alcohol/week: 0.6 oz     Types: 1 Cans of beer per week   ? Drug use: No   ? Sexual activity: No   Other Topics Concern   ? 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  Sees a complementary/alternative practitioner as well          Medications  Allergies   Current Outpatient Medications   Medication Sig Dispense Refill   ? albuterol (PROAIR HFA;PROVENTIL HFA;VENTOLIN HFA) 90 mcg/actuation inhaler Inhale 2 puffs every 6 (six) hours as needed for wheezing. 1 each 0   ? metoprolol tartrate (LOPRESSOR) 25 MG tablet Take 1 tablet (25 mg total) by mouth 2 (two) times a day as needed (palpitations). 30 tablet 2   ? herbal drugs Tab Take by mouth.       No current facility-administered medications for this visit.       Allergies   Allergen Reactions   ? Pollen Other (See Comments)         Lab Results    Chemistry/lipid CBC Cardiac Enzymes/BNP/TSH/INR   Lab Results   Component Value Date    CHOL 133 07/31/2015    HDL 53 07/31/2015    LDLCALC 67 07/31/2015    TRIG 64 07/31/2015    No results found for: WBC, HGB, HCT, MCV, PLT Lab Results   Component Value Date    TSH 0.97 07/31/2015

## 2021-07-03 NOTE — ADDENDUM NOTE
Addendum Note by Lori Pavon DO at 12/14/2018  3:15 PM     Author: Lori Pavon DO Service: -- Author Type: Physician    Filed: 12/15/2018 10:46 AM Encounter Date: 12/14/2018 Status: Signed    : Lori Pavon DO (Physician)    Addended by: LORI PAVON on: 12/15/2018 10:46 AM        Modules accepted: Level of Service

## 2021-07-16 ENCOUNTER — OFFICE VISIT (OUTPATIENT)
Dept: INTERNAL MEDICINE | Age: 26
End: 2021-07-16

## 2021-07-16 ENCOUNTER — TELEPHONE (OUTPATIENT)
Dept: INTERNAL MEDICINE | Age: 26
End: 2021-07-16

## 2021-07-16 VITALS
WEIGHT: 141.31 LBS | HEIGHT: 67 IN | OXYGEN SATURATION: 98 % | BODY MASS INDEX: 22.18 KG/M2 | SYSTOLIC BLOOD PRESSURE: 96 MMHG | DIASTOLIC BLOOD PRESSURE: 56 MMHG | HEART RATE: 56 BPM

## 2021-07-16 DIAGNOSIS — Z76.89 ENCOUNTER TO ESTABLISH CARE: Primary | ICD-10-CM

## 2021-07-16 DIAGNOSIS — Z00.00 PREVENTATIVE HEALTH CARE: ICD-10-CM

## 2021-07-16 DIAGNOSIS — R42 LIGHTHEADEDNESS: ICD-10-CM

## 2021-07-16 DIAGNOSIS — K51.90 ULCERATIVE COLITIS WITHOUT COMPLICATIONS, UNSPECIFIED LOCATION (CMD): ICD-10-CM

## 2021-07-16 PROCEDURE — 99385 PREV VISIT NEW AGE 18-39: CPT | Performed by: PEDIATRICS

## 2021-07-16 RX ORDER — ALBUTEROL SULFATE 90 UG/1
2 AEROSOL, METERED RESPIRATORY (INHALATION)
COMMUNITY
Start: 2018-12-14

## 2021-07-16 RX ORDER — MESALAMINE 1.2 G/1
2.4 TABLET, DELAYED RELEASE ORAL
Qty: 60 TABLET | Refills: 1 | Status: SHIPPED | OUTPATIENT
Start: 2021-07-16 | End: 2021-09-17

## 2021-07-16 RX ORDER — MESALAMINE 1.2 G/1
1200 TABLET, DELAYED RELEASE ORAL
COMMUNITY
End: 2021-07-16 | Stop reason: SDUPTHER

## 2021-07-16 ASSESSMENT — PATIENT HEALTH QUESTIONNAIRE - PHQ9
SUM OF ALL RESPONSES TO PHQ9 QUESTIONS 1 AND 2: 0
SUM OF ALL RESPONSES TO PHQ9 QUESTIONS 1 AND 2: 0
CLINICAL INTERPRETATION OF PHQ2 SCORE: NO FURTHER SCREENING NEEDED
2. FEELING DOWN, DEPRESSED OR HOPELESS: NOT AT ALL
CLINICAL INTERPRETATION OF PHQ9 SCORE: NO FURTHER SCREENING NEEDED
1. LITTLE INTEREST OR PLEASURE IN DOING THINGS: NOT AT ALL

## 2021-07-20 ENCOUNTER — TELEPHONE (OUTPATIENT)
Dept: INTERNAL MEDICINE | Age: 26
End: 2021-07-20

## 2021-09-17 RX ORDER — MESALAMINE 1.2 G/1
TABLET, DELAYED RELEASE ORAL
Qty: 60 TABLET | Refills: 1 | Status: SHIPPED | OUTPATIENT
Start: 2021-09-17 | End: 2021-12-30 | Stop reason: SDUPTHER

## 2021-09-21 ENCOUNTER — TELEPHONE (OUTPATIENT)
Dept: ADMISSION | Age: 26
End: 2021-09-21

## 2021-09-22 ENCOUNTER — OFFICE VISIT (OUTPATIENT)
Dept: GASTROENTEROLOGY | Age: 26
End: 2021-09-22

## 2021-09-22 ENCOUNTER — LAB SERVICES (OUTPATIENT)
Dept: LAB | Age: 26
End: 2021-09-22
Attending: INTERNAL MEDICINE

## 2021-09-22 VITALS
WEIGHT: 141 LBS | SYSTOLIC BLOOD PRESSURE: 100 MMHG | OXYGEN SATURATION: 99 % | DIASTOLIC BLOOD PRESSURE: 60 MMHG | HEART RATE: 56 BPM | BODY MASS INDEX: 22.08 KG/M2

## 2021-09-22 DIAGNOSIS — K51.00 ULCERATIVE PANCOLITIS WITHOUT COMPLICATION (CMD): ICD-10-CM

## 2021-09-22 DIAGNOSIS — R10.13 EPIGASTRIC ABDOMINAL PAIN: Primary | ICD-10-CM

## 2021-09-22 DIAGNOSIS — R10.13 EPIGASTRIC ABDOMINAL PAIN: ICD-10-CM

## 2021-09-22 LAB
ALBUMIN SERPL-MCNC: 4.1 G/DL (ref 3.6–5.1)
ALBUMIN/GLOB SERPL: 1.4 {RATIO} (ref 1–2.4)
ALP SERPL-CCNC: 92 UNITS/L (ref 45–117)
ALT SERPL-CCNC: 22 UNITS/L
ANION GAP SERPL CALC-SCNC: 10 MMOL/L (ref 10–20)
AST SERPL-CCNC: 13 UNITS/L
BASOPHILS # BLD: 0.1 K/MCL (ref 0–0.3)
BASOPHILS NFR BLD: 2 %
BILIRUB SERPL-MCNC: 0.9 MG/DL (ref 0.2–1)
BUN SERPL-MCNC: 15 MG/DL (ref 6–20)
BUN/CREAT SERPL: 13 (ref 7–25)
CALCIUM SERPL-MCNC: 8.7 MG/DL (ref 8.4–10.2)
CHLORIDE SERPL-SCNC: 105 MMOL/L (ref 98–107)
CO2 SERPL-SCNC: 32 MMOL/L (ref 21–32)
CREAT SERPL-MCNC: 1.12 MG/DL (ref 0.67–1.17)
DEPRECATED RDW RBC: 40.3 FL (ref 39–50)
EOSINOPHIL # BLD: 0.2 K/MCL (ref 0–0.5)
EOSINOPHIL NFR BLD: 3 %
ERYTHROCYTE [DISTWIDTH] IN BLOOD: 11.9 % (ref 11–15)
FASTING DURATION TIME PATIENT: ABNORMAL H
GFR SERPLBLD BASED ON 1.73 SQ M-ARVRAT: 89 ML/MIN
GLOBULIN SER-MCNC: 3 G/DL (ref 2–4)
GLUCOSE SERPL-MCNC: 102 MG/DL (ref 65–99)
HCT VFR BLD CALC: 45.7 % (ref 39–51)
HGB BLD-MCNC: 15.7 G/DL (ref 13–17)
IMM GRANULOCYTES # BLD AUTO: 0 K/MCL (ref 0–0.2)
IMM GRANULOCYTES # BLD: 0 %
LYMPHOCYTES # BLD: 1.5 K/MCL (ref 1–4.8)
LYMPHOCYTES NFR BLD: 30 %
MCH RBC QN AUTO: 31.8 PG (ref 26–34)
MCHC RBC AUTO-ENTMCNC: 34.4 G/DL (ref 32–36.5)
MCV RBC AUTO: 92.5 FL (ref 78–100)
MONOCYTES # BLD: 0.6 K/MCL (ref 0.3–0.9)
MONOCYTES NFR BLD: 13 %
NEUTROPHILS # BLD: 2.5 K/MCL (ref 1.8–7.7)
NEUTROPHILS NFR BLD: 52 %
NRBC BLD MANUAL-RTO: 0 /100 WBC
PLATELET # BLD AUTO: 232 K/MCL (ref 140–450)
POTASSIUM SERPL-SCNC: 4.6 MMOL/L (ref 3.4–5.1)
PROT SERPL-MCNC: 7.1 G/DL (ref 6.4–8.2)
RBC # BLD: 4.94 MIL/MCL (ref 4.5–5.9)
SODIUM SERPL-SCNC: 142 MMOL/L (ref 135–145)
WBC # BLD: 4.8 K/MCL (ref 4.2–11)

## 2021-09-22 PROCEDURE — 82784 ASSAY IGA/IGD/IGG/IGM EACH: CPT

## 2021-09-22 PROCEDURE — 80053 COMPREHEN METABOLIC PANEL: CPT

## 2021-09-22 PROCEDURE — 83516 IMMUNOASSAY NONANTIBODY: CPT

## 2021-09-22 PROCEDURE — 36415 COLL VENOUS BLD VENIPUNCTURE: CPT

## 2021-09-22 PROCEDURE — 85025 COMPLETE CBC W/AUTO DIFF WBC: CPT

## 2021-09-22 PROCEDURE — 99243 OFF/OP CNSLTJ NEW/EST LOW 30: CPT | Performed by: INTERNAL MEDICINE

## 2021-09-23 LAB — IGA SERPL-MCNC: 231 MG/DL (ref 82–453)

## 2021-09-24 LAB
TTG IGA SER IA-ACNC: 18 UNITS
TTG IGG SER IA-ACNC: 3 UNITS

## 2021-09-27 ENCOUNTER — TELEPHONE (OUTPATIENT)
Dept: GASTROENTEROLOGY | Age: 26
End: 2021-09-27

## 2021-10-03 ENCOUNTER — HEALTH MAINTENANCE LETTER (OUTPATIENT)
Age: 26
End: 2021-10-03

## 2021-10-24 ENCOUNTER — NURSE ONLY (OUTPATIENT)
Dept: URGENT CARE | Age: 26
End: 2021-10-24

## 2021-10-24 DIAGNOSIS — Z23 NEEDS FLU SHOT: Primary | ICD-10-CM

## 2021-10-24 PROCEDURE — 90686 IIV4 VACC NO PRSV 0.5 ML IM: CPT

## 2021-10-24 PROCEDURE — 90471 IMMUNIZATION ADMIN: CPT

## 2021-12-29 ENCOUNTER — E-ADVICE (OUTPATIENT)
Dept: GASTROENTEROLOGY | Age: 26
End: 2021-12-29

## 2021-12-30 RX ORDER — MESALAMINE 1.2 G/1
2.4 TABLET, DELAYED RELEASE ORAL
Qty: 180 TABLET | Refills: 3 | Status: SHIPPED | OUTPATIENT
Start: 2021-12-30 | End: 2023-01-10 | Stop reason: SDUPTHER

## 2022-01-10 ENCOUNTER — EMPLOYEE HEALTH (OUTPATIENT)
Dept: OTHER | Age: 27
End: 2022-01-10

## 2022-04-22 ENCOUNTER — NURSE ONLY (OUTPATIENT)
Dept: OCCUPATIONAL MEDICINE | Age: 27
End: 2022-04-22

## 2022-04-22 DIAGNOSIS — Z02.89 VISIT FOR OCCUPATIONAL HEALTH EXAMINATION: Primary | ICD-10-CM

## 2022-04-22 PROCEDURE — 86480 TB TEST CELL IMMUN MEASURE: CPT | Performed by: INTERNAL MEDICINE

## 2022-04-22 PROCEDURE — 36415 COLL VENOUS BLD VENIPUNCTURE: CPT | Performed by: NURSE PRACTITIONER

## 2022-04-24 LAB
GAMMA INTERFERON BACKGROUND BLD IA-ACNC: 0.02 IU/ML
M TB IFN-G BLD-IMP: NEGATIVE
M TB IFN-G CD4+ BCKGRND COR BLD-ACNC: 0.01 IU/ML
M TB IFN-G CD4+CD8+ BCKGRND COR BLD-ACNC: 0 IU/ML
MITOGEN IGNF BCKGRD COR BLD-ACNC: >10 IU/ML

## 2022-07-10 ENCOUNTER — HEALTH MAINTENANCE LETTER (OUTPATIENT)
Age: 27
End: 2022-07-10

## 2022-09-10 ENCOUNTER — HEALTH MAINTENANCE LETTER (OUTPATIENT)
Age: 27
End: 2022-09-10

## 2022-09-13 ENCOUNTER — NURSE ONLY (OUTPATIENT)
Dept: OCCUPATIONAL MEDICINE | Age: 27
End: 2022-09-13

## 2022-09-13 DIAGNOSIS — Z02.89 VISIT FOR OCCUPATIONAL HEALTH EXAMINATION: Primary | ICD-10-CM

## 2022-09-13 PROCEDURE — 36415 COLL VENOUS BLD VENIPUNCTURE: CPT | Performed by: PHYSICIAN ASSISTANT

## 2022-09-13 PROCEDURE — 86480 TB TEST CELL IMMUN MEASURE: CPT | Performed by: INTERNAL MEDICINE

## 2022-09-15 LAB
GAMMA INTERFERON BACKGROUND BLD IA-ACNC: 0.02 IU/ML
M TB IFN-G BLD-IMP: NEGATIVE
M TB IFN-G CD4+ BCKGRND COR BLD-ACNC: 0 IU/ML
M TB IFN-G CD4+CD8+ BCKGRND COR BLD-ACNC: 0.01 IU/ML
MITOGEN IGNF BCKGRD COR BLD-ACNC: 5.5 IU/ML

## 2022-10-30 ENCOUNTER — EMPLOYEE HEALTH (OUTPATIENT)
Dept: OTHER | Age: 27
End: 2022-10-30

## 2022-11-01 ENCOUNTER — EMPLOYEE HEALTH (OUTPATIENT)
Dept: OTHER | Age: 27
End: 2022-11-01

## 2022-11-05 ENCOUNTER — IMMUNIZATION (OUTPATIENT)
Dept: URGENT CARE | Age: 27
End: 2022-11-05

## 2022-11-05 DIAGNOSIS — Z23 ENCOUNTER FOR IMMUNIZATION: Primary | ICD-10-CM

## 2022-11-05 PROBLEM — I45.10 INCOMPLETE RBBB: Status: ACTIVE | Noted: 2018-12-14

## 2022-11-05 PROBLEM — R06.2 WHEEZING: Status: ACTIVE | Noted: 2018-12-14

## 2022-11-05 PROBLEM — R00.2 PALPITATIONS: Status: ACTIVE | Noted: 2018-12-14

## 2022-11-05 PROBLEM — R06.2 WHEEZING: Status: RESOLVED | Noted: 2018-12-14 | Resolved: 2022-11-05

## 2022-11-05 PROCEDURE — 90686 IIV4 VACC NO PRSV 0.5 ML IM: CPT | Performed by: NURSE PRACTITIONER

## 2022-11-05 PROCEDURE — 90471 IMMUNIZATION ADMIN: CPT | Performed by: NURSE PRACTITIONER

## 2023-04-20 ENCOUNTER — LAB SERVICES (OUTPATIENT)
Dept: LAB | Age: 28
End: 2023-04-20
Attending: INTERNAL MEDICINE

## 2023-04-20 ENCOUNTER — OFFICE VISIT (OUTPATIENT)
Dept: GASTROENTEROLOGY | Age: 28
End: 2023-04-20

## 2023-04-20 VITALS
OXYGEN SATURATION: 98 % | WEIGHT: 134 LBS | DIASTOLIC BLOOD PRESSURE: 60 MMHG | HEART RATE: 60 BPM | SYSTOLIC BLOOD PRESSURE: 100 MMHG | BODY MASS INDEX: 20.99 KG/M2

## 2023-04-20 DIAGNOSIS — K51.00 ULCERATIVE PANCOLITIS WITHOUT COMPLICATION (CMD): ICD-10-CM

## 2023-04-20 DIAGNOSIS — K51.00 ULCERATIVE PANCOLITIS WITHOUT COMPLICATION (CMD): Primary | ICD-10-CM

## 2023-04-20 LAB
ALBUMIN SERPL-MCNC: 4.1 G/DL (ref 3.6–5.1)
ALBUMIN/GLOB SERPL: 1.2 {RATIO} (ref 1–2.4)
ALP SERPL-CCNC: 92 UNITS/L (ref 45–117)
ALT SERPL-CCNC: 26 UNITS/L
ANION GAP SERPL CALC-SCNC: 8 MMOL/L (ref 7–19)
AST SERPL-CCNC: 16 UNITS/L
BASOPHILS # BLD: 0.1 K/MCL (ref 0–0.3)
BASOPHILS NFR BLD: 2 %
BILIRUB SERPL-MCNC: 0.7 MG/DL (ref 0.2–1)
BUN SERPL-MCNC: 18 MG/DL (ref 6–20)
BUN/CREAT SERPL: 14 (ref 7–25)
CALCIUM SERPL-MCNC: 8.5 MG/DL (ref 8.4–10.2)
CHLORIDE SERPL-SCNC: 104 MMOL/L (ref 97–110)
CO2 SERPL-SCNC: 34 MMOL/L (ref 21–32)
CREAT SERPL-MCNC: 1.33 MG/DL (ref 0.67–1.17)
DEPRECATED RDW RBC: 39.1 FL (ref 39–50)
EOSINOPHIL # BLD: 0.2 K/MCL (ref 0–0.5)
EOSINOPHIL NFR BLD: 5 %
ERYTHROCYTE [DISTWIDTH] IN BLOOD: 11.6 % (ref 11–15)
FASTING DURATION TIME PATIENT: ABNORMAL H
GFR SERPLBLD BASED ON 1.73 SQ M-ARVRAT: 74 ML/MIN
GLOBULIN SER-MCNC: 3.3 G/DL (ref 2–4)
GLUCOSE SERPL-MCNC: 93 MG/DL (ref 70–99)
HCT VFR BLD CALC: 46.5 % (ref 39–51)
HGB BLD-MCNC: 15.7 G/DL (ref 13–17)
IMM GRANULOCYTES # BLD AUTO: 0 K/MCL (ref 0–0.2)
IMM GRANULOCYTES # BLD: 0 %
LYMPHOCYTES # BLD: 1.5 K/MCL (ref 1–4.8)
LYMPHOCYTES NFR BLD: 36 %
MCH RBC QN AUTO: 30.7 PG (ref 26–34)
MCHC RBC AUTO-ENTMCNC: 33.8 G/DL (ref 32–36.5)
MCV RBC AUTO: 91 FL (ref 78–100)
MONOCYTES # BLD: 0.5 K/MCL (ref 0.3–0.9)
MONOCYTES NFR BLD: 13 %
NEUTROPHILS # BLD: 1.9 K/MCL (ref 1.8–7.7)
NEUTROPHILS NFR BLD: 44 %
NRBC BLD MANUAL-RTO: 0 /100 WBC
PLATELET # BLD AUTO: 275 K/MCL (ref 140–450)
POTASSIUM SERPL-SCNC: 4.3 MMOL/L (ref 3.4–5.1)
PROT SERPL-MCNC: 7.4 G/DL (ref 6.4–8.2)
RBC # BLD: 5.11 MIL/MCL (ref 4.5–5.9)
SODIUM SERPL-SCNC: 142 MMOL/L (ref 135–145)
WBC # BLD: 4.2 K/MCL (ref 4.2–11)

## 2023-04-20 PROCEDURE — 99214 OFFICE O/P EST MOD 30 MIN: CPT | Performed by: INTERNAL MEDICINE

## 2023-04-20 PROCEDURE — 36415 COLL VENOUS BLD VENIPUNCTURE: CPT

## 2023-04-20 PROCEDURE — 85025 COMPLETE CBC W/AUTO DIFF WBC: CPT

## 2023-04-20 PROCEDURE — 80053 COMPREHEN METABOLIC PANEL: CPT

## 2023-04-20 RX ORDER — MESALAMINE 1.2 G/1
1.2 TABLET, DELAYED RELEASE ORAL
Qty: 90 TABLET | Refills: 3 | Status: SHIPPED | OUTPATIENT
Start: 2023-04-20

## 2023-04-20 RX ORDER — MESALAMINE 1.2 G/1
1.2 TABLET, DELAYED RELEASE ORAL
Qty: 90 TABLET | Refills: 3 | Status: SHIPPED | OUTPATIENT
Start: 2023-04-20 | End: 2023-04-20 | Stop reason: SDUPTHER

## 2023-04-21 DIAGNOSIS — N28.9 RENAL INSUFFICIENCY: Primary | ICD-10-CM

## 2023-04-26 ENCOUNTER — LAB SERVICES (OUTPATIENT)
Dept: LAB | Age: 28
End: 2023-04-26

## 2023-04-26 DIAGNOSIS — N28.9 RENAL INSUFFICIENCY: ICD-10-CM

## 2023-04-26 PROCEDURE — 80048 BASIC METABOLIC PNL TOTAL CA: CPT | Performed by: INTERNAL MEDICINE

## 2023-04-26 PROCEDURE — 36415 COLL VENOUS BLD VENIPUNCTURE: CPT | Performed by: INTERNAL MEDICINE

## 2023-04-27 LAB
ANION GAP SERPL CALC-SCNC: 6 MMOL/L (ref 7–19)
BUN SERPL-MCNC: 17 MG/DL (ref 6–20)
BUN/CREAT SERPL: 13 (ref 7–25)
CALCIUM SERPL-MCNC: 9.2 MG/DL (ref 8.4–10.2)
CHLORIDE SERPL-SCNC: 103 MMOL/L (ref 97–110)
CO2 SERPL-SCNC: 31 MMOL/L (ref 21–32)
CREAT SERPL-MCNC: 1.3 MG/DL (ref 0.67–1.17)
FASTING DURATION TIME PATIENT: ABNORMAL H
GFR SERPLBLD BASED ON 1.73 SQ M-ARVRAT: 76 ML/MIN
GLUCOSE SERPL-MCNC: 88 MG/DL (ref 70–99)
POTASSIUM SERPL-SCNC: 4.4 MMOL/L (ref 3.4–5.1)
SODIUM SERPL-SCNC: 136 MMOL/L (ref 135–145)

## 2023-07-23 ENCOUNTER — HEALTH MAINTENANCE LETTER (OUTPATIENT)
Age: 28
End: 2023-07-23

## 2023-08-17 ENCOUNTER — V-VISIT (OUTPATIENT)
Dept: PEDIATRICS | Age: 28
End: 2023-08-17

## 2023-08-17 DIAGNOSIS — R79.89 ELEVATED SERUM CREATININE: ICD-10-CM

## 2023-08-17 DIAGNOSIS — Z00.00 PREVENTATIVE HEALTH CARE: Primary | ICD-10-CM

## 2023-08-17 PROCEDURE — 99395 PREV VISIT EST AGE 18-39: CPT | Performed by: PEDIATRICS

## 2023-08-17 ASSESSMENT — PATIENT HEALTH QUESTIONNAIRE - PHQ9
1. LITTLE INTEREST OR PLEASURE IN DOING THINGS: NOT AT ALL
2. FEELING DOWN, DEPRESSED OR HOPELESS: NOT AT ALL
SUM OF ALL RESPONSES TO PHQ9 QUESTIONS 1 AND 2: 0
CLINICAL INTERPRETATION OF PHQ2 SCORE: NO FURTHER SCREENING NEEDED
SUM OF ALL RESPONSES TO PHQ9 QUESTIONS 1 AND 2: 0

## 2023-08-24 ENCOUNTER — LAB SERVICES (OUTPATIENT)
Dept: LAB | Age: 28
End: 2023-08-24

## 2023-08-24 DIAGNOSIS — R79.89 ELEVATED SERUM CREATININE: ICD-10-CM

## 2023-08-24 LAB
ANION GAP SERPL CALC-SCNC: 8 MMOL/L (ref 7–19)
BUN SERPL-MCNC: 18 MG/DL (ref 6–20)
BUN/CREAT SERPL: 15 (ref 7–25)
CALCIUM SERPL-MCNC: 8.8 MG/DL (ref 8.4–10.2)
CHLORIDE SERPL-SCNC: 103 MMOL/L (ref 97–110)
CO2 SERPL-SCNC: 31 MMOL/L (ref 21–32)
CREAT SERPL-MCNC: 1.21 MG/DL (ref 0.67–1.17)
EGFRCR SERPLBLD CKD-EPI 2021: 83 ML/MIN/{1.73_M2}
FASTING DURATION TIME PATIENT: ABNORMAL H
GLUCOSE SERPL-MCNC: 101 MG/DL (ref 70–99)
POTASSIUM SERPL-SCNC: 4.3 MMOL/L (ref 3.4–5.1)
SODIUM SERPL-SCNC: 138 MMOL/L (ref 135–145)

## 2023-08-24 PROCEDURE — 36415 COLL VENOUS BLD VENIPUNCTURE: CPT | Performed by: INTERNAL MEDICINE

## 2023-08-24 PROCEDURE — 80048 BASIC METABOLIC PNL TOTAL CA: CPT | Performed by: INTERNAL MEDICINE

## 2023-08-25 ENCOUNTER — TELEPHONE (OUTPATIENT)
Dept: PEDIATRICS | Age: 28
End: 2023-08-25

## 2023-08-25 DIAGNOSIS — R79.89 ELEVATED SERUM CREATININE: Primary | ICD-10-CM

## 2024-03-26 ENCOUNTER — PREP FOR CASE (OUTPATIENT)
Dept: GASTROENTEROLOGY | Age: 29
End: 2024-03-26

## 2024-03-26 ENCOUNTER — TELEPHONE (OUTPATIENT)
Dept: GASTROENTEROLOGY | Age: 29
End: 2024-03-26

## 2024-04-02 RX ORDER — MESALAMINE 1.2 G/1
1.2 TABLET, DELAYED RELEASE ORAL
Qty: 90 TABLET | Refills: 1 | Status: SHIPPED | OUTPATIENT
Start: 2024-04-02

## 2024-05-23 ENCOUNTER — TELEPHONE (OUTPATIENT)
Dept: PEDIATRICS | Age: 29
End: 2024-05-23

## 2024-05-23 DIAGNOSIS — Z11.1 SCREENING EXAMINATION FOR PULMONARY TUBERCULOSIS: Primary | ICD-10-CM

## 2024-05-24 ENCOUNTER — LAB SERVICES (OUTPATIENT)
Dept: LAB | Age: 29
End: 2024-05-24

## 2024-05-24 DIAGNOSIS — Z11.1 SCREENING EXAMINATION FOR PULMONARY TUBERCULOSIS: ICD-10-CM

## 2024-05-24 PROCEDURE — 36415 COLL VENOUS BLD VENIPUNCTURE: CPT | Performed by: INTERNAL MEDICINE

## 2024-05-24 PROCEDURE — 86480 TB TEST CELL IMMUN MEASURE: CPT | Performed by: CLINICAL MEDICAL LABORATORY

## 2024-05-26 LAB
GAMMA INTERFERON BACKGROUND BLD IA-ACNC: 0.03 IU/ML
M TB IFN-G BLD-IMP: NEGATIVE
M TB IFN-G CD4+ BCKGRND COR BLD-ACNC: 0.01 IU/ML
M TB IFN-G CD4+CD8+ BCKGRND COR BLD-ACNC: 0.01 IU/ML
MITOGEN IGNF BCKGRD COR BLD-ACNC: 6.18 IU/ML

## 2024-05-28 ENCOUNTER — TELEPHONE (OUTPATIENT)
Dept: PEDIATRICS | Age: 29
End: 2024-05-28

## 2024-05-30 ENCOUNTER — APPOINTMENT (OUTPATIENT)
Dept: DERMATOLOGY | Age: 29
End: 2024-05-30

## 2024-05-30 DIAGNOSIS — D18.01 HEMANGIOMA OF SKIN: Primary | ICD-10-CM

## 2024-05-30 PROCEDURE — 99202 OFFICE O/P NEW SF 15 MIN: CPT | Performed by: STUDENT IN AN ORGANIZED HEALTH CARE EDUCATION/TRAINING PROGRAM

## 2024-06-07 ENCOUNTER — TELEPHONE (OUTPATIENT)
Dept: GASTROENTEROLOGY | Age: 29
End: 2024-06-07

## 2024-06-10 ENCOUNTER — E-ADVICE (OUTPATIENT)
Dept: GASTROENTEROLOGY | Age: 29
End: 2024-06-10

## 2024-06-11 ENCOUNTER — ANESTHESIA EVENT (OUTPATIENT)
Dept: SURGERY | Age: 29
End: 2024-06-11

## 2024-06-11 SDOH — SOCIAL STABILITY: SOCIAL INSECURITY: RISK FACTORS: PULMONARY DISEASE

## 2024-06-11 SDOH — SOCIAL STABILITY: SOCIAL INSECURITY: RISK FACTORS: KIDNEY DISEASE

## 2024-06-11 ASSESSMENT — ACTIVITIES OF DAILY LIVING (ADL)
NEEDS_ASSIST: NO
ADL_BEFORE_ADMISSION: INDEPENDENT
ADL_SHORT_OF_BREATH: NO
CHRONIC_PAIN_PRESENT: NO
ADL_SCORE: 12
RECENT_DECLINE_ADL: NO
HISTORY OF FALLING IN THE LAST YEAR (PRIOR TO ADMISSION): NO

## 2024-06-12 ENCOUNTER — HOSPITAL ENCOUNTER (OUTPATIENT)
Age: 29
Discharge: HOME | End: 2024-06-12
Attending: INTERNAL MEDICINE | Admitting: INTERNAL MEDICINE

## 2024-06-12 ENCOUNTER — ANESTHESIA (OUTPATIENT)
Dept: SURGERY | Age: 29
End: 2024-06-12

## 2024-06-12 VITALS
OXYGEN SATURATION: 99 % | HEIGHT: 67 IN | HEART RATE: 70 BPM | RESPIRATION RATE: 17 BRPM | WEIGHT: 131 LBS | BODY MASS INDEX: 20.56 KG/M2 | TEMPERATURE: 98.7 F | DIASTOLIC BLOOD PRESSURE: 65 MMHG | SYSTOLIC BLOOD PRESSURE: 102 MMHG

## 2024-06-12 DIAGNOSIS — Z86.010 HX OF COLONIC POLYPS: ICD-10-CM

## 2024-06-12 PROCEDURE — 45380 COLONOSCOPY AND BIOPSY: CPT | Performed by: INTERNAL MEDICINE

## 2024-06-12 PROCEDURE — 88305 TISSUE EXAM BY PATHOLOGIST: CPT | Performed by: INTERNAL MEDICINE

## 2024-06-12 PROCEDURE — 10002362 HB ANESTH MAC IV 1ST 1/2 HR: Performed by: INTERNAL MEDICINE

## 2024-06-12 PROCEDURE — 10004185 HB COUNTER-VISIT  CENSUS

## 2024-06-12 PROCEDURE — 10002801 HB RX 250 W/O HCPCS: Performed by: ANESTHESIOLOGY

## 2024-06-12 PROCEDURE — 10002807 HB RX 258: Performed by: ANESTHESIOLOGY

## 2024-06-12 PROCEDURE — 10003428 HB COLONOSCOPY: Performed by: INTERNAL MEDICINE

## 2024-06-12 PROCEDURE — 10002800 HB RX 250 W HCPCS: Performed by: ANESTHESIOLOGY

## 2024-06-12 RX ORDER — MESALAMINE 1.2 G/1
2.4 TABLET, DELAYED RELEASE ORAL
Qty: 180 TABLET | Refills: 3 | Status: SHIPPED | OUTPATIENT
Start: 2024-06-12

## 2024-06-12 RX ORDER — LIDOCAINE HYDROCHLORIDE 10 MG/ML
INJECTION, SOLUTION INFILTRATION; PERINEURAL PRN
Status: DISCONTINUED | OUTPATIENT
Start: 2024-06-12 | End: 2024-06-12

## 2024-06-12 RX ORDER — NICOTINE POLACRILEX 4 MG
30 LOZENGE BUCCAL
Status: DISCONTINUED | OUTPATIENT
Start: 2024-06-12 | End: 2024-06-12 | Stop reason: HOSPADM

## 2024-06-12 RX ORDER — SODIUM CHLORIDE 9 MG/ML
INJECTION, SOLUTION INTRAVENOUS CONTINUOUS
Status: DISCONTINUED | OUTPATIENT
Start: 2024-06-12 | End: 2024-06-12 | Stop reason: HOSPADM

## 2024-06-12 RX ORDER — LORATADINE 10 MG/1
10 TABLET ORAL DAILY
COMMUNITY

## 2024-06-12 RX ORDER — PROPOFOL 10 MG/ML
INJECTION, EMULSION INTRAVENOUS PRN
Status: DISCONTINUED | OUTPATIENT
Start: 2024-06-12 | End: 2024-06-12

## 2024-06-12 RX ORDER — SODIUM CHLORIDE, SODIUM LACTATE, POTASSIUM CHLORIDE, CALCIUM CHLORIDE 600; 310; 30; 20 MG/100ML; MG/100ML; MG/100ML; MG/100ML
INJECTION, SOLUTION INTRAVENOUS CONTINUOUS PRN
Status: DISCONTINUED | OUTPATIENT
Start: 2024-06-12 | End: 2024-06-12

## 2024-06-12 RX ORDER — 0.9 % SODIUM CHLORIDE 0.9 %
2 VIAL (ML) INJECTION EVERY 12 HOURS SCHEDULED
Status: DISCONTINUED | OUTPATIENT
Start: 2024-06-12 | End: 2024-06-12 | Stop reason: HOSPADM

## 2024-06-12 RX ORDER — SODIUM CHLORIDE, SODIUM LACTATE, POTASSIUM CHLORIDE, CALCIUM CHLORIDE 600; 310; 30; 20 MG/100ML; MG/100ML; MG/100ML; MG/100ML
INJECTION, SOLUTION INTRAVENOUS CONTINUOUS
Status: DISCONTINUED | OUTPATIENT
Start: 2024-06-12 | End: 2024-06-12 | Stop reason: HOSPADM

## 2024-06-12 RX ORDER — LIDOCAINE HYDROCHLORIDE 10 MG/ML
5 INJECTION, SOLUTION INFILTRATION; PERINEURAL PRN
Status: DISCONTINUED | OUTPATIENT
Start: 2024-06-12 | End: 2024-06-12 | Stop reason: HOSPADM

## 2024-06-12 RX ORDER — DEXTROSE MONOHYDRATE 25 G/50ML
25 INJECTION, SOLUTION INTRAVENOUS PRN
Status: DISCONTINUED | OUTPATIENT
Start: 2024-06-12 | End: 2024-06-12 | Stop reason: HOSPADM

## 2024-06-12 RX ADMIN — PROPOFOL 50 MG: 10 INJECTION, EMULSION INTRAVENOUS at 08:59

## 2024-06-12 RX ADMIN — SODIUM CHLORIDE, POTASSIUM CHLORIDE, SODIUM LACTATE AND CALCIUM CHLORIDE: 600; 310; 30; 20 INJECTION, SOLUTION INTRAVENOUS at 08:52

## 2024-06-12 RX ADMIN — PROPOFOL 120 MCG/KG/MIN: 10 INJECTION, EMULSION INTRAVENOUS at 08:55

## 2024-06-12 RX ADMIN — LIDOCAINE HYDROCHLORIDE 50 MG: 10 INJECTION, SOLUTION INFILTRATION; PERINEURAL at 08:55

## 2024-06-12 RX ADMIN — SODIUM CHLORIDE, POTASSIUM CHLORIDE, SODIUM LACTATE AND CALCIUM CHLORIDE: 600; 310; 30; 20 INJECTION, SOLUTION INTRAVENOUS at 08:43

## 2024-06-12 RX ADMIN — PROPOFOL 50 MG: 10 INJECTION, EMULSION INTRAVENOUS at 08:55

## 2024-06-12 ASSESSMENT — PAIN SCALES - GENERAL
PAINLEVEL_OUTOF10: 0

## 2024-06-12 ASSESSMENT — PAIN SCALES - WONG BAKER: WONGBAKER_NUMERICALRESPONSE: 0

## 2024-06-14 ENCOUNTER — TELEPHONE (OUTPATIENT)
Dept: GASTROENTEROLOGY | Age: 29
End: 2024-06-14

## 2024-06-14 LAB
ASR DISCLAIMER: NORMAL
CASE RPRT: NORMAL
CLINICAL INFO: NORMAL
PATH REPORT.FINAL DX SPEC: NORMAL
PATH REPORT.GROSS SPEC: NORMAL
PATH REPORT.MICROSCOPIC SPEC OTHER STN: NORMAL

## 2025-03-17 ENCOUNTER — TELEPHONE (OUTPATIENT)
Dept: PEDIATRICS | Age: 30
End: 2025-03-17

## 2025-07-10 RX ORDER — MESALAMINE 1.2 G/1
2.4 TABLET, DELAYED RELEASE ORAL
Qty: 180 TABLET | Refills: 1 | Status: SHIPPED | OUTPATIENT
Start: 2025-07-10

## 2025-12-09 ENCOUNTER — APPOINTMENT (OUTPATIENT)
Dept: GASTROENTEROLOGY | Age: 30
End: 2025-12-09

## 2026-02-19 ENCOUNTER — APPOINTMENT (OUTPATIENT)
Dept: PEDIATRICS | Age: 31
End: 2026-02-19

## 2026-03-11 ENCOUNTER — APPOINTMENT (OUTPATIENT)
Dept: PEDIATRICS | Age: 31
End: 2026-03-11

## (undated) DEVICE — TUBING SCT CLR 12FT .25IN MDVC MAXI-GRIP NCDTV MALE TO MALE

## (undated) DEVICE — FORCEPS BIOPSY MICROMESH TEETH STREAMLINE CATH NDL 240CM 2.8

## (undated) DEVICE — KIT ESCP 5 PC DEFENDO OLYMPUS GI ESCP

## (undated) DEVICE — SYRINGE 50ML ECNTRC TIP STRL MED LF DISP BD

## (undated) DEVICE — FORCEPS BIOPSY NDL 240CM 2.2MM RJ 4 2.8MM STD CAPACITY STRL

## (undated) DEVICE — JELLY LUB 1.25OZ HR SAFEWRAP ONESHOT STRL LF

## (undated) DEVICE — KIT ENDO CLN 500ML BDSD ADD H2O RESL STNDUP POUCH PAD 1STP